# Patient Record
Sex: FEMALE | Race: OTHER | Employment: UNEMPLOYED | URBAN - METROPOLITAN AREA
[De-identification: names, ages, dates, MRNs, and addresses within clinical notes are randomized per-mention and may not be internally consistent; named-entity substitution may affect disease eponyms.]

---

## 2019-11-03 ENCOUNTER — OFFICE VISIT (OUTPATIENT)
Dept: FAMILY MEDICINE CLINIC | Facility: CLINIC | Age: 64
End: 2019-11-03

## 2019-11-03 VITALS
WEIGHT: 188.38 LBS | SYSTOLIC BLOOD PRESSURE: 146 MMHG | RESPIRATION RATE: 16 BRPM | BODY MASS INDEX: 33 KG/M2 | DIASTOLIC BLOOD PRESSURE: 84 MMHG | HEART RATE: 96 BPM | TEMPERATURE: 98 F | OXYGEN SATURATION: 98 %

## 2019-11-03 DIAGNOSIS — M79.602 LEFT ARM PAIN: Primary | ICD-10-CM

## 2019-11-03 NOTE — PROGRESS NOTES
Well nourished 61 yo female in for left arm pain for 5 hours. + mild trapezius/latissimus dorsi muscle \"ache\" Relates arm is \"numb\". Left  weaker than right. Lungs clear + slight cough. +Denies constitutional complaints or injury.   Daughter here